# Patient Record
Sex: FEMALE | Race: WHITE | NOT HISPANIC OR LATINO | Employment: OTHER | URBAN - METROPOLITAN AREA
[De-identification: names, ages, dates, MRNs, and addresses within clinical notes are randomized per-mention and may not be internally consistent; named-entity substitution may affect disease eponyms.]

---

## 2017-10-31 ENCOUNTER — TRANSCRIBE ORDERS (OUTPATIENT)
Dept: ADMINISTRATIVE | Facility: HOSPITAL | Age: 67
End: 2017-10-31

## 2017-10-31 DIAGNOSIS — Z12.31 ENCOUNTER FOR MAMMOGRAM TO ESTABLISH BASELINE MAMMOGRAM: Primary | ICD-10-CM

## 2017-10-31 DIAGNOSIS — Z78.0 POST-MENOPAUSAL: ICD-10-CM

## 2017-12-01 ENCOUNTER — HOSPITAL ENCOUNTER (OUTPATIENT)
Dept: MAMMOGRAPHY | Facility: HOSPITAL | Age: 67
Discharge: HOME | End: 2017-12-01
Attending: INTERNAL MEDICINE | Admitting: INTERNAL MEDICINE

## 2017-12-01 ENCOUNTER — HOSPITAL ENCOUNTER (OUTPATIENT)
Dept: BONE DENSITY | Facility: HOSPITAL | Age: 67
Discharge: HOME | End: 2017-12-01
Attending: INTERNAL MEDICINE

## 2017-12-01 DIAGNOSIS — Z12.31 ENCOUNTER FOR MAMMOGRAM TO ESTABLISH BASELINE MAMMOGRAM: ICD-10-CM

## 2017-12-01 DIAGNOSIS — Z78.0 POST-MENOPAUSAL: ICD-10-CM

## 2017-12-01 PROCEDURE — G0202 SCR MAMMO BI INCL CAD: HCPCS

## 2017-12-01 PROCEDURE — 77080 DXA BONE DENSITY AXIAL: CPT

## 2018-11-01 ENCOUNTER — TRANSCRIBE ORDERS (OUTPATIENT)
Dept: ADMINISTRATIVE | Facility: HOSPITAL | Age: 68
End: 2018-11-01

## 2018-11-01 DIAGNOSIS — Z12.39 SCREENING BREAST EXAMINATION: Primary | ICD-10-CM

## 2018-11-02 VITALS
OXYGEN SATURATION: 97 % | SYSTOLIC BLOOD PRESSURE: 76 MMHG | HEART RATE: 72 BPM | SYSTOLIC BLOOD PRESSURE: 73 MMHG | RESPIRATION RATE: 14 BRPM | SYSTOLIC BLOOD PRESSURE: 89 MMHG | SYSTOLIC BLOOD PRESSURE: 81 MMHG | HEART RATE: 70 BPM | SYSTOLIC BLOOD PRESSURE: 156 MMHG | HEART RATE: 80 BPM | DIASTOLIC BLOOD PRESSURE: 42 MMHG | TEMPERATURE: 96.8 F | OXYGEN SATURATION: 100 % | RESPIRATION RATE: 12 BRPM | TEMPERATURE: 97.7 F | SYSTOLIC BLOOD PRESSURE: 123 MMHG | HEIGHT: 64 IN | WEIGHT: 152 LBS | HEART RATE: 78 BPM | RESPIRATION RATE: 17 BRPM | HEART RATE: 77 BPM | DIASTOLIC BLOOD PRESSURE: 47 MMHG | DIASTOLIC BLOOD PRESSURE: 55 MMHG | DIASTOLIC BLOOD PRESSURE: 82 MMHG | OXYGEN SATURATION: 98 % | RESPIRATION RATE: 18 BRPM | HEART RATE: 69 BPM | SYSTOLIC BLOOD PRESSURE: 78 MMHG | SYSTOLIC BLOOD PRESSURE: 84 MMHG | HEART RATE: 67 BPM | RESPIRATION RATE: 25 BRPM | DIASTOLIC BLOOD PRESSURE: 33 MMHG | DIASTOLIC BLOOD PRESSURE: 73 MMHG | RESPIRATION RATE: 16 BRPM | DIASTOLIC BLOOD PRESSURE: 50 MMHG | HEART RATE: 76 BPM | DIASTOLIC BLOOD PRESSURE: 37 MMHG | SYSTOLIC BLOOD PRESSURE: 141 MMHG | RESPIRATION RATE: 26 BRPM | DIASTOLIC BLOOD PRESSURE: 54 MMHG | OXYGEN SATURATION: 99 %

## 2018-11-05 ENCOUNTER — AMBULATORY SURGICAL CENTER (AMBULATORY)
Dept: URBAN - METROPOLITAN AREA SURGERY 17 | Facility: SURGERY | Age: 68
End: 2018-11-05
Payer: MEDICARE

## 2018-11-05 DIAGNOSIS — K64.8 OTHER HEMORRHOIDS: ICD-10-CM

## 2018-11-05 DIAGNOSIS — Z86.010 PERSONAL HISTORY OF COLONIC POLYPS: ICD-10-CM

## 2018-11-05 PROBLEM — Z12.11 SURVEILLANCE DUE TO PRIOR COLONIC NEOPLASIA: Status: ACTIVE | Noted: 2018-11-05

## 2018-11-05 PROCEDURE — G0105 COLORECTAL SCRN; HI RISK IND: HCPCS

## 2018-12-04 ENCOUNTER — HOSPITAL ENCOUNTER (OUTPATIENT)
Dept: MAMMOGRAPHY | Facility: HOSPITAL | Age: 68
Discharge: HOME | End: 2018-12-04
Attending: INTERNAL MEDICINE | Admitting: INTERNAL MEDICINE

## 2018-12-04 DIAGNOSIS — Z12.39 SCREENING BREAST EXAMINATION: ICD-10-CM

## 2018-12-04 PROCEDURE — 77063 BREAST TOMOSYNTHESIS BI: CPT

## 2018-12-04 PROCEDURE — 77067 SCR MAMMO BI INCL CAD: CPT

## 2019-11-05 ENCOUNTER — TRANSCRIBE ORDERS (OUTPATIENT)
Dept: ADMINISTRATIVE | Facility: HOSPITAL | Age: 69
End: 2019-11-05

## 2019-11-05 DIAGNOSIS — Z12.31 SCREENING MAMMOGRAM, ENCOUNTER FOR: Primary | ICD-10-CM

## 2019-12-13 ENCOUNTER — HOSPITAL ENCOUNTER (OUTPATIENT)
Dept: MAMMOGRAPHY | Facility: HOSPITAL | Age: 69
Discharge: HOME | End: 2019-12-13
Admitting: INTERNAL MEDICINE

## 2019-12-13 DIAGNOSIS — Z12.31 SCREENING MAMMOGRAM, ENCOUNTER FOR: ICD-10-CM

## 2019-12-13 PROCEDURE — 77067 SCR MAMMO BI INCL CAD: CPT

## 2019-12-13 PROCEDURE — 77063 BREAST TOMOSYNTHESIS BI: CPT

## 2020-11-05 ENCOUNTER — TRANSCRIBE ORDERS (OUTPATIENT)
Dept: ADMINISTRATIVE | Facility: HOSPITAL | Age: 70
End: 2020-11-05

## 2020-11-05 DIAGNOSIS — Z12.31 VISIT FOR SCREENING MAMMOGRAM: Primary | ICD-10-CM

## 2021-01-13 ENCOUNTER — HOSPITAL ENCOUNTER (OUTPATIENT)
Dept: MAMMOGRAPHY | Facility: HOSPITAL | Age: 71
Discharge: HOME | End: 2021-01-13
Admitting: INTERNAL MEDICINE

## 2021-01-13 DIAGNOSIS — Z12.31 VISIT FOR SCREENING MAMMOGRAM: ICD-10-CM

## 2021-01-13 PROCEDURE — 77067 SCR MAMMO BI INCL CAD: CPT

## 2021-01-13 PROCEDURE — 77063 BREAST TOMOSYNTHESIS BI: CPT

## 2021-03-09 DIAGNOSIS — Z23 IMMUNIZATION DUE: ICD-10-CM

## 2021-11-14 ENCOUNTER — HOSPITAL ENCOUNTER (OUTPATIENT)
Facility: HOSPITAL | Age: 71
Setting detail: OBSERVATION
Discharge: HOME | End: 2021-11-15
Attending: EMERGENCY MEDICINE | Admitting: EMERGENCY MEDICINE

## 2021-11-14 DIAGNOSIS — R11.2 NON-INTRACTABLE VOMITING WITH NAUSEA, UNSPECIFIED VOMITING TYPE: ICD-10-CM

## 2021-11-14 DIAGNOSIS — R42 DIZZINESS: Primary | ICD-10-CM

## 2021-11-14 DIAGNOSIS — R42 VERTIGO: ICD-10-CM

## 2021-11-14 LAB
ALBUMIN SERPL-MCNC: 4.5 G/DL (ref 3.5–5.2)
ALBUMIN/GLOB SERPL: 2 G/DL
ALP SERPL-CCNC: 50 U/L (ref 39–117)
ALT SERPL W P-5'-P-CCNC: 21 U/L (ref 1–33)
ANION GAP SERPL CALCULATED.3IONS-SCNC: 12.9 MMOL/L (ref 5–15)
APTT PPP: 33.5 SECONDS (ref 22.7–35.4)
AST SERPL-CCNC: 16 U/L (ref 1–32)
BASOPHILS # BLD AUTO: 0.02 10*3/MM3 (ref 0–0.2)
BASOPHILS NFR BLD AUTO: 0.3 % (ref 0–1.5)
BILIRUB SERPL-MCNC: 0.4 MG/DL (ref 0–1.2)
BUN SERPL-MCNC: 22 MG/DL (ref 8–23)
BUN/CREAT SERPL: 31.4 (ref 7–25)
CALCIUM SPEC-SCNC: 9.5 MG/DL (ref 8.6–10.5)
CHLORIDE SERPL-SCNC: 102 MMOL/L (ref 98–107)
CO2 SERPL-SCNC: 23.1 MMOL/L (ref 22–29)
CREAT SERPL-MCNC: 0.7 MG/DL (ref 0.57–1)
DEPRECATED RDW RBC AUTO: 46.1 FL (ref 37–54)
EOSINOPHIL # BLD AUTO: 0.03 10*3/MM3 (ref 0–0.4)
EOSINOPHIL NFR BLD AUTO: 0.4 % (ref 0.3–6.2)
ERYTHROCYTE [DISTWIDTH] IN BLOOD BY AUTOMATED COUNT: 13.7 % (ref 12.3–15.4)
GFR SERPL CREATININE-BSD FRML MDRD: 82 ML/MIN/1.73
GLOBULIN UR ELPH-MCNC: 2.3 GM/DL
GLUCOSE SERPL-MCNC: 123 MG/DL (ref 65–99)
HCT VFR BLD AUTO: 33 % (ref 34–46.6)
HGB BLD-MCNC: 10.9 G/DL (ref 12–15.9)
HOLD SPECIMEN: NORMAL
HOLD SPECIMEN: NORMAL
IMM GRANULOCYTES # BLD AUTO: 0.03 10*3/MM3 (ref 0–0.05)
IMM GRANULOCYTES NFR BLD AUTO: 0.4 % (ref 0–0.5)
INR PPP: 1.03 (ref 0.9–1.1)
LIPASE SERPL-CCNC: 31 U/L (ref 13–60)
LYMPHOCYTES # BLD AUTO: 0.89 10*3/MM3 (ref 0.7–3.1)
LYMPHOCYTES NFR BLD AUTO: 12.4 % (ref 19.6–45.3)
MCH RBC QN AUTO: 31.7 PG (ref 26.6–33)
MCHC RBC AUTO-ENTMCNC: 33 G/DL (ref 31.5–35.7)
MCV RBC AUTO: 95.9 FL (ref 79–97)
MONOCYTES # BLD AUTO: 0.48 10*3/MM3 (ref 0.1–0.9)
MONOCYTES NFR BLD AUTO: 6.7 % (ref 5–12)
NEUTROPHILS NFR BLD AUTO: 5.72 10*3/MM3 (ref 1.7–7)
NEUTROPHILS NFR BLD AUTO: 79.8 % (ref 42.7–76)
NRBC BLD AUTO-RTO: 0 /100 WBC (ref 0–0.2)
PLATELET # BLD AUTO: 204 10*3/MM3 (ref 140–450)
PMV BLD AUTO: 10.1 FL (ref 6–12)
POTASSIUM SERPL-SCNC: 3.8 MMOL/L (ref 3.5–5.2)
PROT SERPL-MCNC: 6.8 G/DL (ref 6–8.5)
PROTHROMBIN TIME: 13.3 SECONDS (ref 11.7–14.2)
RBC # BLD AUTO: 3.44 10*6/MM3 (ref 3.77–5.28)
SODIUM SERPL-SCNC: 138 MMOL/L (ref 136–145)
WBC # BLD AUTO: 7.17 10*3/MM3 (ref 3.4–10.8)
WHOLE BLOOD HOLD SPECIMEN: NORMAL
WHOLE BLOOD HOLD SPECIMEN: NORMAL

## 2021-11-14 PROCEDURE — 85025 COMPLETE CBC W/AUTO DIFF WBC: CPT | Performed by: EMERGENCY MEDICINE

## 2021-11-14 PROCEDURE — 96375 TX/PRO/DX INJ NEW DRUG ADDON: CPT

## 2021-11-14 PROCEDURE — 93005 ELECTROCARDIOGRAM TRACING: CPT

## 2021-11-14 PROCEDURE — 80053 COMPREHEN METABOLIC PANEL: CPT | Performed by: EMERGENCY MEDICINE

## 2021-11-14 PROCEDURE — 99285 EMERGENCY DEPT VISIT HI MDM: CPT

## 2021-11-14 PROCEDURE — 83690 ASSAY OF LIPASE: CPT | Performed by: EMERGENCY MEDICINE

## 2021-11-14 PROCEDURE — 85610 PROTHROMBIN TIME: CPT | Performed by: EMERGENCY MEDICINE

## 2021-11-14 PROCEDURE — 25010000002 ONDANSETRON PER 1 MG: Performed by: EMERGENCY MEDICINE

## 2021-11-14 PROCEDURE — 96374 THER/PROPH/DIAG INJ IV PUSH: CPT

## 2021-11-14 PROCEDURE — 93010 ELECTROCARDIOGRAM REPORT: CPT | Performed by: INTERNAL MEDICINE

## 2021-11-14 PROCEDURE — 25010000002 DIAZEPAM PER 5 MG: Performed by: EMERGENCY MEDICINE

## 2021-11-14 PROCEDURE — 99204 OFFICE O/P NEW MOD 45 MIN: CPT | Performed by: PSYCHIATRY & NEUROLOGY

## 2021-11-14 PROCEDURE — 87635 SARS-COV-2 COVID-19 AMP PRB: CPT | Performed by: EMERGENCY MEDICINE

## 2021-11-14 PROCEDURE — 85730 THROMBOPLASTIN TIME PARTIAL: CPT | Performed by: EMERGENCY MEDICINE

## 2021-11-14 PROCEDURE — 93005 ELECTROCARDIOGRAM TRACING: CPT | Performed by: EMERGENCY MEDICINE

## 2021-11-14 RX ORDER — DIAZEPAM 5 MG/ML
5 INJECTION, SOLUTION INTRAMUSCULAR; INTRAVENOUS ONCE
Status: COMPLETED | OUTPATIENT
Start: 2021-11-14 | End: 2021-11-14

## 2021-11-14 RX ORDER — SODIUM CHLORIDE 0.9 % (FLUSH) 0.9 %
10 SYRINGE (ML) INJECTION AS NEEDED
Status: DISCONTINUED | OUTPATIENT
Start: 2021-11-14 | End: 2021-11-15 | Stop reason: HOSPADM

## 2021-11-14 RX ORDER — SCOLOPAMINE TRANSDERMAL SYSTEM 1 MG/1
1 PATCH, EXTENDED RELEASE TRANSDERMAL ONCE
Status: DISCONTINUED | OUTPATIENT
Start: 2021-11-14 | End: 2021-11-15 | Stop reason: HOSPADM

## 2021-11-14 RX ORDER — ONDANSETRON 2 MG/ML
4 INJECTION INTRAMUSCULAR; INTRAVENOUS ONCE
Status: COMPLETED | OUTPATIENT
Start: 2021-11-14 | End: 2021-11-14

## 2021-11-14 RX ADMIN — DIAZEPAM 5 MG: 5 INJECTION, SOLUTION INTRAMUSCULAR; INTRAVENOUS at 23:41

## 2021-11-14 RX ADMIN — ONDANSETRON 4 MG: 2 INJECTION INTRAMUSCULAR; INTRAVENOUS at 23:41

## 2021-11-14 RX ADMIN — SCOLOPAMINE TRANSDERMAL SYSTEM 1 PATCH: 1 PATCH, EXTENDED RELEASE TRANSDERMAL at 22:43

## 2021-11-15 ENCOUNTER — APPOINTMENT (OUTPATIENT)
Dept: MRI IMAGING | Facility: HOSPITAL | Age: 71
End: 2021-11-15

## 2021-11-15 ENCOUNTER — APPOINTMENT (OUTPATIENT)
Dept: CT IMAGING | Facility: HOSPITAL | Age: 71
End: 2021-11-15

## 2021-11-15 VITALS
RESPIRATION RATE: 16 BRPM | HEART RATE: 84 BPM | TEMPERATURE: 98 F | WEIGHT: 150 LBS | DIASTOLIC BLOOD PRESSURE: 67 MMHG | BODY MASS INDEX: 25.61 KG/M2 | SYSTOLIC BLOOD PRESSURE: 115 MMHG | HEIGHT: 64 IN | OXYGEN SATURATION: 96 %

## 2021-11-15 PROBLEM — R42 DIZZINESS: Status: ACTIVE | Noted: 2021-11-15

## 2021-11-15 LAB
BILIRUB UR QL STRIP: NEGATIVE
CLARITY UR: CLEAR
COLOR UR: YELLOW
GLUCOSE UR STRIP-MCNC: NEGATIVE MG/DL
HGB UR QL STRIP.AUTO: NEGATIVE
KETONES UR QL STRIP: NEGATIVE
LEUKOCYTE ESTERASE UR QL STRIP.AUTO: NEGATIVE
NITRITE UR QL STRIP: NEGATIVE
PH UR STRIP.AUTO: 6.5 [PH] (ref 5–8)
PROT UR QL STRIP: NEGATIVE
QT INTERVAL: 395 MS
SARS-COV-2 RNA PNL SPEC NAA+PROBE: NOT DETECTED
SP GR UR STRIP: 1.02 (ref 1–1.03)
UROBILINOGEN UR QL STRIP: NORMAL

## 2021-11-15 PROCEDURE — 70498 CT ANGIOGRAPHY NECK: CPT

## 2021-11-15 PROCEDURE — 97116 GAIT TRAINING THERAPY: CPT

## 2021-11-15 PROCEDURE — 70496 CT ANGIOGRAPHY HEAD: CPT

## 2021-11-15 PROCEDURE — 99214 OFFICE O/P EST MOD 30 MIN: CPT | Performed by: PSYCHIATRY & NEUROLOGY

## 2021-11-15 PROCEDURE — 97161 PT EVAL LOW COMPLEX 20 MIN: CPT

## 2021-11-15 PROCEDURE — G0378 HOSPITAL OBSERVATION PER HR: HCPCS

## 2021-11-15 PROCEDURE — 0 IOPAMIDOL PER 1 ML: Performed by: EMERGENCY MEDICINE

## 2021-11-15 PROCEDURE — 25010000002 LORAZEPAM PER 2 MG: Performed by: EMERGENCY MEDICINE

## 2021-11-15 PROCEDURE — 81003 URINALYSIS AUTO W/O SCOPE: CPT | Performed by: EMERGENCY MEDICINE

## 2021-11-15 PROCEDURE — 70553 MRI BRAIN STEM W/O & W/DYE: CPT

## 2021-11-15 PROCEDURE — 0 GADOBENATE DIMEGLUMINE 529 MG/ML SOLUTION: Performed by: EMERGENCY MEDICINE

## 2021-11-15 PROCEDURE — 96375 TX/PRO/DX INJ NEW DRUG ADDON: CPT

## 2021-11-15 PROCEDURE — A9577 INJ MULTIHANCE: HCPCS | Performed by: EMERGENCY MEDICINE

## 2021-11-15 RX ORDER — LEVOTHYROXINE SODIUM 88 UG/1
88 TABLET ORAL
Status: DISCONTINUED | OUTPATIENT
Start: 2021-11-15 | End: 2021-11-15 | Stop reason: HOSPADM

## 2021-11-15 RX ORDER — HYDROCHLOROTHIAZIDE 12.5 MG/1
12.5 TABLET ORAL DAILY
COMMUNITY

## 2021-11-15 RX ORDER — OLMESARTAN MEDOXOMIL 20 MG/1
40 TABLET ORAL DAILY
COMMUNITY

## 2021-11-15 RX ORDER — LEVOTHYROXINE SODIUM 88 UG/1
88 TABLET ORAL DAILY
COMMUNITY

## 2021-11-15 RX ORDER — SCOLOPAMINE TRANSDERMAL SYSTEM 1 MG/1
1 PATCH, EXTENDED RELEASE TRANSDERMAL ONCE
Qty: 4 PATCH | Refills: 0 | Status: SHIPPED | OUTPATIENT
Start: 2021-11-15 | End: 2021-11-22

## 2021-11-15 RX ORDER — ONDANSETRON 4 MG/1
4 TABLET, FILM COATED ORAL EVERY 6 HOURS PRN
Status: DISCONTINUED | OUTPATIENT
Start: 2021-11-15 | End: 2021-11-15 | Stop reason: HOSPADM

## 2021-11-15 RX ORDER — ATORVASTATIN CALCIUM 20 MG/1
40 TABLET, FILM COATED ORAL DAILY
Status: DISCONTINUED | OUTPATIENT
Start: 2021-11-15 | End: 2021-11-15 | Stop reason: HOSPADM

## 2021-11-15 RX ORDER — LORAZEPAM 2 MG/ML
1 INJECTION INTRAMUSCULAR ONCE
Status: COMPLETED | OUTPATIENT
Start: 2021-11-15 | End: 2021-11-15

## 2021-11-15 RX ORDER — HYDROCHLOROTHIAZIDE 12.5 MG/1
12.5 TABLET ORAL DAILY
Status: DISCONTINUED | OUTPATIENT
Start: 2021-11-15 | End: 2021-11-15 | Stop reason: HOSPADM

## 2021-11-15 RX ORDER — LOSARTAN POTASSIUM 50 MG/1
50 TABLET ORAL
Status: DISCONTINUED | OUTPATIENT
Start: 2021-11-15 | End: 2021-11-15 | Stop reason: HOSPADM

## 2021-11-15 RX ORDER — NITROGLYCERIN 0.4 MG/1
0.4 TABLET SUBLINGUAL
Status: DISCONTINUED | OUTPATIENT
Start: 2021-11-15 | End: 2021-11-15 | Stop reason: HOSPADM

## 2021-11-15 RX ORDER — MONTELUKAST SODIUM 10 MG/1
10 TABLET ORAL NIGHTLY
Status: DISCONTINUED | OUTPATIENT
Start: 2021-11-15 | End: 2021-11-15 | Stop reason: HOSPADM

## 2021-11-15 RX ORDER — SODIUM CHLORIDE 0.9 % (FLUSH) 0.9 %
10 SYRINGE (ML) INJECTION AS NEEDED
Status: DISCONTINUED | OUTPATIENT
Start: 2021-11-15 | End: 2021-11-15 | Stop reason: HOSPADM

## 2021-11-15 RX ORDER — ONDANSETRON 2 MG/ML
4 INJECTION INTRAMUSCULAR; INTRAVENOUS EVERY 6 HOURS PRN
Status: DISCONTINUED | OUTPATIENT
Start: 2021-11-15 | End: 2021-11-15 | Stop reason: HOSPADM

## 2021-11-15 RX ORDER — ATORVASTATIN CALCIUM 40 MG/1
40 TABLET, FILM COATED ORAL DAILY
COMMUNITY

## 2021-11-15 RX ORDER — SODIUM CHLORIDE 0.9 % (FLUSH) 0.9 %
10 SYRINGE (ML) INJECTION EVERY 12 HOURS SCHEDULED
Status: DISCONTINUED | OUTPATIENT
Start: 2021-11-15 | End: 2021-11-15 | Stop reason: HOSPADM

## 2021-11-15 RX ORDER — MONTELUKAST SODIUM 10 MG/1
10 TABLET ORAL DAILY
COMMUNITY

## 2021-11-15 RX ADMIN — ATORVASTATIN CALCIUM 40 MG: 20 TABLET, FILM COATED ORAL at 09:00

## 2021-11-15 RX ADMIN — IOPAMIDOL 85 ML: 755 INJECTION, SOLUTION INTRAVENOUS at 00:50

## 2021-11-15 RX ADMIN — GADOBENATE DIMEGLUMINE 14 ML: 529 INJECTION, SOLUTION INTRAVENOUS at 07:55

## 2021-11-15 RX ADMIN — SODIUM CHLORIDE, PRESERVATIVE FREE 10 ML: 5 INJECTION INTRAVENOUS at 09:00

## 2021-11-15 RX ADMIN — HYDROCHLOROTHIAZIDE 12.5 MG: 12.5 CAPSULE ORAL at 09:00

## 2021-11-15 RX ADMIN — LORAZEPAM 1 MG: 2 INJECTION INTRAMUSCULAR; INTRAVENOUS at 07:12

## 2021-11-15 RX ADMIN — LOSARTAN POTASSIUM 50 MG: 50 TABLET, FILM COATED ORAL at 09:00

## 2021-11-15 RX ADMIN — LEVOTHYROXINE SODIUM 88 MCG: 0.09 TABLET ORAL at 05:43

## 2021-11-15 RX ADMIN — SODIUM CHLORIDE, PRESERVATIVE FREE 10 ML: 5 INJECTION INTRAVENOUS at 02:29

## 2021-11-15 RX ADMIN — CARBAMIDE PEROXIDE 6.5% 5 DROP: 6.5 LIQUID AURICULAR (OTIC) at 09:00

## 2021-11-15 NOTE — PROGRESS NOTES
[facility]     Progress Note    Name: Roro Masters ADMIT: 2021   : 1950  PCP: Radha Prajapati MD    MRN: [id number] LOS: 0 days   AGE/SEX: 71 y.o. female  ROOM: [facility]/     Subjective   Subjective     Subjective   Patient reports her dizziness has improved some but not totally gone. She denies other complaints at this time.    Review of Systems   Neurological: Positive for dizziness.   All other systems reviewed and are negative.         Objective   Objective     Objective   Vital Signs  Temp:  [97.5 °F (36.4 °C)-97.9 °F (36.6 °C)] 97.6 °F (36.4 °C)  Heart Rate:  [71-90] 74  Resp:  [16-18] 16  BP: (127-175)/(64-90) 137/82  SpO2:  [97 %-100 %] 98 %  on   ;   Device (Oxygen Therapy): room air  Body mass index is 25.75 kg/m².  Physical Exam  Vitals and nursing note reviewed.   Constitutional:       General: She is not in acute distress.     Appearance: Normal appearance. She is normal weight.   HENT:      Head: Normocephalic and atraumatic.      Nose: Nose normal.      Mouth/Throat:      Mouth: Mucous membranes are moist.   Eyes:      Extraocular Movements: Extraocular movements intact.   Cardiovascular:      Rate and Rhythm: Normal rate and regular rhythm.      Pulses: Normal pulses.      Heart sounds: Normal heart sounds.   Pulmonary:      Effort: Pulmonary effort is normal.      Breath sounds: Normal breath sounds.   Abdominal:      General: Abdomen is flat. Bowel sounds are normal.      Palpations: Abdomen is soft.   Musculoskeletal:         General: No swelling. Normal range of motion.      Cervical back: Normal range of motion and neck supple.   Skin:     General: Skin is warm and dry.   Neurological:      General: No focal deficit present.      Mental Status: She is alert and oriented to person, place, and time. Mental status is at baseline.   Psychiatric:         Mood and Affect: Mood normal.         Behavior: Behavior normal.         Thought Content: Thought content normal.         Judgment:  Judgment normal.         Results Review     I reviewed the patient's new clinical results.  Results from last 7 days   Lab Units 11/14/21  2233   WBC 10*3/mm3 7.17   HEMOGLOBIN g/dL 10.9*   PLATELETS 10*3/mm3 204     Results from last 7 days   Lab Units 11/14/21  2233   SODIUM mmol/L 138   POTASSIUM mmol/L 3.8   CHLORIDE mmol/L 102   CO2 mmol/L 23.1   BUN mg/dL 22   CREATININE mg/dL 0.70   GLUCOSE mg/dL 123*   Estimated Creatinine Clearance: 59.3 mL/min (by C-G formula based on SCr of 0.7 mg/dL).  Results from last 7 days   Lab Units 11/14/21  2233   ALBUMIN g/dL 4.50   BILIRUBIN mg/dL 0.4   ALK PHOS U/L 50   AST (SGOT) U/L 16   ALT (SGPT) U/L 21     Results from last 7 days   Lab Units 11/14/21  2233   CALCIUM mg/dL 9.5   ALBUMIN g/dL 4.50       COVID19   Date Value Ref Range Status   11/14/2021 Not Detected Not Detected - Ref. Range Final     No results found for: HGBA1C, POCGLU    CT Angiogram Head w AI Analysis of LVO, CT Angiogram Neck  Narrative: NONCONTRAST CRANIAL CT SCAN, CT ANGIOGRAM NECK, CT ANGIOGRAM HEAD.     HISTORY: Dizziness.     COMPARISON: None..     TECHNIQUE:  Radiation dose reduction techniques were utilized, including  automated exposure control and exposure modulation based on body size.   Initially, a routine noncontrast cranial CT performed from the skull  base through the vertex region. After review, thin-section contrast  enhanced CT angiogram images obtained from the aortic arch through the  calvarial vertex utilizing angiographic technique. Multi projection 3-D  MIP reformatted images were supplemented and reviewed.     FINDINGS CRANIAL CT: No acute hemorrhage or midline shift is  demonstrated..  Ventricular size and configuration is within normal  limits for age..  Postcontrast imaging does not demonstrate any abnormal  enhancement or evidence of venous occlusion.  Bone window images Reveal  no significant osseous findings..        CERVICAL CAROTID CT ANGIOGRAM:     FINDINGS: There is a  short common trunk of the brachiocephalic artery  and left common carotid artery. Both common carotid arteries are widely  patent. There is some calcified plaque which is noted at the right  carotid bifurcation. This results in narrowing in the range of 30%. Some  additional mild calcified plaque is seen within the proximal right  internal carotid artery, without hemodynamically significant stenosis.  Additional mild plaque is noted at the left carotid bifurcation, again  without hemodynamically significant stenosis. The vertebral arteries  appear unremarkable bilaterally. Left is slightly larger in caliber.  Images through the lung apices do not demonstrate any acute  abnormalities. Left lobe of thyroid gland is atrophic or absent. There  are multilevel degenerative changes of the cervical spine.        NASCET criteria utilized in stenosis measurements. Stenosis mild, 0-49%.     CRANIAL CTA ANGIOGRAM:     FINDINGS:  The intracranial internal carotid arteries are patent. There  is some calcified plaque, without hemodynamically significant stenosis.  There is a patent anterior communicating artery. Anterior cerebral  arteries are normal. Middle cerebral arteries are unremarkable  bilaterally.  Both intracranial vertebral arteries are patent. Left is  dominant. Basilar artery is normal. The posterior cerebral arteries are  patent bilaterally..          AI analysis of LVO was utilized.     Radiation dose reduction techniques were utilized, including automated  exposure control and exposure modulation based on body size.        Impression: 1. No acute intracranial findings.  2. Mild narrowing of the proximal right internal carotid artery, in the  range of 30%.  3. No intracranial stenosis or occlusion.     FINDINGS were relayed to Dr. Lyles at 1:10 AM.     This report was finalized on 11/15/2021 2:28 AM by Dr. Nichol Hendricks M.D.       Scheduled Medications  atorvastatin, 40 mg, Oral, Daily  carbamide peroxide, 5  drop, Both Ears, BID  hydroCHLOROthiazide, 12.5 mg, Oral, Daily  levothyroxine, 88 mcg, Oral, Q AM  losartan, 50 mg, Oral, Q24H  montelukast, 10 mg, Oral, Nightly  Scopolamine, 1 patch, Transdermal, Once  sodium chloride, 10 mL, Intravenous, Q12H    Infusions   Diet  Diet Regular         Assessment/Plan   Assessment / Plan       Active Hospital Problems    Diagnosis  POA   • Dizziness [R42]  Yes      Resolved Hospital Problems   No resolved problems to display.       71 y.o. female admitted with dizziness. Patient reports this dizziness began yesterday and has only slightly improved since arriving in ED. Patient does additionally report nausea with has improved. Denies unilateral weakness, headache, visual or sensation changes, gait abnormalities or difficulty speaking or swallowing. Workup in ED was fairly unremarkable and CTA head/neck were negative. MRI was done but still pending read. Neurology has been consulted and is also still pending at this time. Will await PT consult for Epley maneuver and likely send home today if MRI negative.     · SCDs for DVT prophylaxis.  · Full code.  · Discussed with patient.  · Anticipate discharge today.    This progress note will additionally serve as discharge summary.     LAINE Melendez  [facility]  11/15/21  08:21 EST

## 2021-11-15 NOTE — CONSULTS
DOS: 2021  NAME: Roro Masters   : 1950  PCP: Radha Prajapati MD  CC: Stroke  Referring MD: No ref. provider found    Neurological Problem and Interval History:  71 y.o. right-handed white female with a Hx of hypertension and earwax buildup and goes to the ENT twice a year to get her earwax cleaned out noticed today morning that she was extremely off balance and very nauseated started throwing up.  Later this evening her  brought her to the emergency room as she was not getting better.  She is currently afraid to move her head too much as she feels that the whole room is spinning from right to the left.  She also has a left upward gaze nystagmus which is exacerbated by the Cody-Hallpike maneuver.  She denies any history of recent sinus infection or earaches or ear drainage or loss of hearing.  She often gets tinnitus but not today.  She denies any weakness of her arms and legs or any double vision or any seizure-like activity or loss of bowel and bladder control..    Past Medical/Surgical Hx:  No past medical history on file.  Past Surgical History:   Procedure Laterality Date   • HYSTERECTOMY     • OOPHORECTOMY         Review of Systems:    Constitutional: Pleasant lady currently laying in bed very still as she is afraid to move  Cardiovascular: Denies any chest pain or palpitations.  Respiratory: Denies any shortness of breath.  Gastrointestinal: Has been extremely nauseated earlier but currently stable.  Genitourinary: Denies any bladder incontinence.  Musculoskeletal: Denies any aches and pains in the muscles.  Dermatological: Denies any skin breakdown.  Neurological: Complains of room spinning.  Psychiatric: Denies any underlying anxiety or depression.  Ophthalmological: Denies any visual changes.          Medications On Admission  (Not in a hospital admission)      Allergies:  Allergies   Allergen Reactions   • Sulfa Antibiotics        Social Hx:  Social History     Socioeconomic History  "  • Marital status:        Family Hx:  Family History   Problem Relation Age of Onset   • Breast cancer Brother    • Breast cancer Sister        Review of Imaging (Interpretation of images not reports): CT of the brain along with CT angiogram of the head and neck with contrast is currently pending.        Laboratory Results:   Lab Results   Component Value Date    CALCIUM 9.9 06/28/2019     06/28/2019    K 4.0 06/28/2019    CO2 30 06/28/2019     06/28/2019    BUN 23 (H) 06/28/2019    CREATININE 0.80 09/28/2020    BCR 25.6 06/28/2019     Lab Results   Component Value Date    WBC 7.17 11/14/2021    HGB 10.9 (L) 11/14/2021    HCT 33.0 (L) 11/14/2021    MCV 95.9 11/14/2021     11/14/2021         Physical Examination:  /86 (Patient Position: Standing)   Pulse 85   Temp 97.9 °F (36.6 °C) (Tympanic)   Resp 16   Ht 162.6 cm (64\")   Wt 68 kg (150 lb)   LMP  (LMP Unknown)   SpO2 100%   BMI 25.75 kg/m²   General Appearance:   Well developed, well nourished, well groomed, alert, and cooperative.  HEENT: Normocephalic.  Normal fundoscopic exam including normal retina, discs are flat with sharp margins, normal vasculature.  Neck and Spine: Normal range of motion.  Normal alignment. No mass or tenderness. No bruits.  Cardiac: Regular rate and rhythm. No murmurs.  Peripheral Vasculature: Radial and pedal pulses are equal and symmetric. No signs of distal embolization.  Extremities:    No edema or deformities. Normal joint ROM. LEATHA hoses and SCD's in place  Skin:    No rashes or birth marks.    Neurological examination:  Higher Integrative  Function: Oriented to time, place and person. Normal registration, recall, attention span and concentration. Normal language including comprehension, spontaneous speech, repetition, reading, writing, naming and vocabulary. No neglect with normal visual-spatial function and construction. Normal fund of knowledge and higher integrative function.  CN " II: Pupils are equal, round, and reactive to light. Normal visual acuity and visual fields.    CN III IV VI: Extraocular movements are full without nystagmus.   CN V: Normal facial sensation and strength of muscles of mastication.  CN VII: Facial movements are symmetric. No weakness.  CN VIII:   Auditory acuity is normal.  CN IX & X:   Symmetric palatal movement.  CN XI: Sternocleidomastoid and trapezius are normal.  No weakness.  CN XII:   The tongue is midline.  No atrophy or fasciculations.  Motor: Normal muscle strength, bulk and tone in upper and lower extremities.  No fasciculations, rigidity, spasticity, or abnormal movements.  Reflexes: 2+ in the upper and lower extremities. Plantar responses are flexor.  Sensation: Normal to light touch, pinprick, vibration, temperature, and proprioception in arms and legs. Normal graphesthesia and no extinction on DSS.  Station and Gait: The Cody-Hallpike maneuver is positive with right upbeat nystagmus noted.  Coordination: Finger to nose test shows no dysmetria.  Rapid alternating movements are normal.  Heel to shin normal.    NIHSS:    Baseline  0-->Alert: keenly responsive  0-->Answers both questions correctly  0-->Performs both tasks correctly  0=normal  0=No visual loss  0=Normal symmetric movement  0-->No drift: limb holds 90 (or 45) degrees for full 10 secs  0-->No drift: limb holds 90 (or 45) degrees for full 10 secs  0-->No drift: limb holds 90 (or 45) degrees for full 10 secs  0-->No drift: limb holds 90 (or 45) degrees for full 10 secs  0=Absent  0=Normal; no sensory loss  0=No aphasia, normal  0=Normal  0=No abnormality    Total score: 0    Diagnoses / Discussion:  71 y.o. who presents with Sx of acute vertiginous symptoms with feeling of the room spinning from the right to the left along with left upgaze nystagmus which is sustained on performing the Cody-Hallpike maneuver.    Plan:  CT angiogram of the head and neck with contrast is currently pending.  MRI of  the brain with and without contrast with thin sections through bilateral internal auditory canals to rule out any schwannomas or vestibulitis.  Scopolamine patch 1.5 mg every 72 hours.   To have physical therapy performed the Epley maneuver.  To use Debrox eardrops for disimpaction of bilateral earwax..     I have discussed the above with the patient and family.  From tomorrow onwards, my colleague Dr. Ted Garcia will follow up the patient.  Time spent with patient: 60min    Coding    Dictated using Dragon dictation.

## 2021-11-15 NOTE — H&P
".   [facility]   HISTORY AND PHYSICAL    Patient Name: Roro Masters  : 1950  MRN: [id number]  Primary Care Physician:  Radha Prajapati MD  Date of admission: 2021    Subjective   Subjective     Chief Complaint: Dizziness      HPI:    Roro Masters is a 71 y.o. female with past medical history of hypertension, hyperlipidemia, RA, and hypothyroidism presents [facility] for evaluation of her dizziness.  Patient reports around 0900 yesterday morning she experienced some dizziness while she was cooking breakfast in the kitchen.  Patient describes her dizziness as\" room spinning\" while moving.  Patient states that she sat down and her symptoms improved.  Reports physical activity  exacerbate her symptoms.  Patient reports associated nausea but denies vomiting.  Patient reports no history of vertigo.  Denies any recent sinus infection or earaches or ear drainage or loss of hearing.  Denies losing balance or falling but does report being unsteady on her feet.  Denies headache, chest pain, or dyspnea.  Denies fever or cough.  Denies abdominal pain, vomiting or diarrhea.  Denies edema to lower extremities.  Denies recent exposure to Covid 19.    Review of Systems   All systems were reviewed and negative except for: Dizziness with movement     Personal History     Past medical history  Hyperlipidemia  Hypothyroidism  Hypertension  Rheumatoid arthritis    Past Surgical History:   Procedure Laterality Date   • HYSTERECTOMY     • OOPHORECTOMY         Family History: family history includes Breast cancer in her brother and sister. Otherwise pertinent FHx was reviewed and not pertinent to current issue.    Social History:      Home Medications:       Allergies:  Allergies   Allergen Reactions   • Sulfa Antibiotics        Objective   Objective     Vitals:   Temp:  [97.9 °F (36.6 °C)] 97.9 °F (36.6 °C)  Heart Rate:  [71-90] 77  Resp:  [16] 16  BP: (127-175)/(64-90) 127/64  Physical " Exam    Constitutional: Awake, alert   Eyes: PERRLA, sclerae anicteric, no conjunctival injection   HENT: NCAT, mucous membranes moist   Neck: Supple, no thyromegaly, no lymphadenopathy, trachea midline   Respiratory: Clear to auscultation bilaterally, nonlabored respirations    Cardiovascular: RRR, no murmurs, rubs, or gallops, palpable pedal pulses bilaterally   Gastrointestinal: Positive bowel sounds, soft, nontender, nondistended   Musculoskeletal: No bilateral ankle edema, no clubbing or cyanosis to extremities   Psychiatric: Appropriate affect, cooperative   Neurologic: Oriented x 3, strength symmetric in all extremities, Cranial Nerves grossly intact to confrontation, speech clear   Skin: No rashes     NIH Stroke Scale      Interval: Baseline  Time: 01:47 EST  Person Administering Scale: LAINE Michel    Administer stroke scale items in the order listed. Record performance in each category after each subscale exam. Do not go back and change scores. Follow directions provided for each exam technique. Scores should reflect what the patient does, not what the clinician thinks the patient can do. The clinician should record answers while administering the exam and work quickly. Except where indicated, the patient should not be coached (i.e., repeated requests to patient to make a special effort).      1a  Level of consciousness: 0=alert; keenly responsive   1b. LOC questions:  0=Performs both tasks correctly   1c. LOC commands: 0=Answers both questions correctly   2.  Best Gaze: 0=normal   3.  Visual: 0=No visual loss   4. Facial Palsy: 0=Normal symmetric movement   5a.  Motor left arm: 0=No drift, limb holds 90 (or 45) degrees for full 10 seconds   5b.  Motor right arm: 0=No drift, limb holds 90 (or 45) degrees for full 10 seconds   6a. motor left le=No drift, limb holds 90 (or 45) degrees for full 10 seconds   6b  Motor right le=No drift, limb holds 90 (or 45) degrees for full 10 seconds   7.  Limb Ataxia: 0=Absent   8.  Sensory: 1=Mild to moderate sensory loss; patient feels pinprick is less sharp or is dull on the affected side; there is a loss of superficial pain with pinprick but patient is aware She is being touched   9. Best Language:  0=No aphasia, normal   10. Dysarthria: 0=Normal   11. Extinction and Inattention: 0=No abnormality   12. Distal motor function: 0=Normal    Total:   0     Result Review    Result Review:  I have personally reviewed the results from the time of this admission to 11/15/2021 01:39 EST and agree with these findings:  [x]  Laboratory  []  Microbiology  [x]  Radiology  []  EKG/Telemetry   []  Cardiology/Vascular   []  Pathology  []  Old records  []  Other:  Most notable findings include:  EKG negative for ischemic changes  CTA head and neck pending  Glucose 123, BUN/creatinine ratio 31.4, lipase 31,  Assessment/Plan   Assessment / Plan     Brief Patient Summary:  Roro Masters is a 71 y.o. female who was seen and evaluated at the ED for dizziness.  Patient is being admitted to observation for further evaluation and neurology consult.    Active Hospital Problems:  Active Hospital Problems    Diagnosis    • Dizziness      Plan:   Dizziness  CTA head and neck pending  MRI brain with and without in a.m.  PT consult for Epley maneuver  Scopolamine patch  Neurology consult    Nausea  Antiemetic medication as needed    Hypertension and hyperlipidemia  Continue home medication    Hypothyroidism  Continue home medication    Rheumatoid arthritis  Continue home medication    DVT prophylaxis:  SCds      CODE STATUS:     Full    Admission Status:  I believe this patient meets observation status.    Electronically signed by LAINE Michel, 11/15/21, 1:39 AM EST.

## 2021-11-15 NOTE — THERAPY EVALUATION
Patient Name: Roro Masters  : 1950    MRN: [id number]                              Today's Date: 11/15/2021       Admit Date: 2021    Visit Dx:     ICD-10-CM ICD-9-CM   1. Dizziness  R42 780.4   2. Vertigo  R42 780.4   3. Non-intractable vomiting with nausea, unspecified vomiting type  R11.2 787.01     Patient Active Problem List   Diagnosis   • Dizziness     Past Medical History:   Diagnosis Date   • Disease of thyroid gland    • Hyperlipidemia    • Hypertension      Past Surgical History:   Procedure Laterality Date   • HYSTERECTOMY     • OOPHORECTOMY        General Information     Row Name 11/15/21 1230          Physical Therapy Time and Intention    Document Type evaluation  -AE     Mode of Treatment individual therapy; physical therapy  -AE     Row Name 11/15/21 1230          General Information    Patient Profile Reviewed yes  -AE     Prior Level of Function independent:  -AE     Existing Precautions/Restrictions fall  -AE     Row Name 11/15/21 1230          Living Environment    Lives With spouse  -AE     Row Name 11/15/21 1230          Home Main Entrance    Number of Stairs, Main Entrance four  -AE     Stair Railings, Main Entrance railings safe and in good condition; railing on left side (ascending)  -AE     Row Name 11/15/21 1230          Stairs Within Home, Primary    Number of Stairs, Within Home, Primary ten  -AE     Stair Railings, Within Home, Primary railings safe and in good condition; railing on right side (ascending)  -AE     Row Name 11/15/21 1230          Cognition    Orientation Status (Cognition) oriented x 4  -AE     Row Name 11/15/21 1230          Safety Issues, Functional Mobility    Safety Issues Affecting Function (Mobility) insight into deficits/self-awareness; safety precautions follow-through/compliance; positioning of assistive device  -AE     Impairments Affecting Function (Mobility) balance; sensation/sensory awareness  -AE           User Key  (r) = Recorded By, (t)  = Taken By, (c) = Cosigned By    Initials Name Provider Type    AE Clarissa Coombs PT Physical Therapist               Mobility     Row Name 11/15/21 1230          Bed Mobility    Bed Mobility bed mobility (all) activities  -AE     All Activities, [place] (Bed Mobility) contact guard assist; 1 person assist  -AE     Row Name 11/15/21 1230          Transfers    Comment (Transfers) supervision/SBA with use of FWW  -AE     Row Name 11/15/21 1230          Bed-Chair Transfer    Bed-Chair [place] (Transfers) supervision; standby assist; 1 person assist  -AE     Assistive Device (Bed-Chair Transfers) walker, front-wheeled  -AE     Row Name 11/15/21 1230          Sit-Stand Transfer    Sit-Stand [place] (Transfers) supervision; standby assist; 1 person assist  -AE     Assistive Device (Sit-Stand Transfers) walker, front-wheeled  -AE     Row Name 11/15/21 1230          Gait/Stairs (Locomotion)    [place] Level (Gait) standby assist; 1 person assist  -AE     Assistive Device (Gait) walker, front-wheeled  -AE     Distance in Feet (Gait) 120ft  -AE     Deviations/Abnormal Patterns (Gait) antalgic  -AE     Bilateral Gait Deviations forward flexed posture; heel strike decreased  -AE     Right Sided Gait Deviations leans right  -AE     [place] Level (Stairs) contact guard  -AE     Handrail Location (Stairs) left side (ascending); right side (ascending)  -AE     Number of Steps (Stairs) 4  -AE     Ascending Technique (Stairs) step-to-step  -AE     Descending Technique (Stairs) step-to-step  -AE           User Key  (r) = Recorded By, (t) = Taken By, (c) = Cosigned By    Initials Name Provider Type    AE Clarissa Coombs PT Physical Therapist               Obj/Interventions     Row Name 11/15/21 1232          Range of Motion Comprehensive    Comment, General Range of Motion BLE WFL  -AE     Row Name 11/15/21 1232          Strength Comprehensive (MMT)    Comment, General Manual Muscle Testing (MMT)  Assessment Generalized weakness  -AE           User Key  (r) = Recorded By, (t) = Taken By, (c) = Cosigned By    Initials Name Provider Type    AE Clarissa Coombs, NI Physical Therapist               Goals/Plan    No documentation.                Clinical Impression     Row Name 11/15/21 1232          Pain    Additional Documentation Pain Scale: Numbers Pre/Post-Treatment (Group)  -AE     Row Name 11/15/21 1232          Pain Scale: Numbers Pre/Post-Treatment    Pretreatment Pain Rating 0/10 - no pain  -AE     Posttreatment Pain Rating 0/10 - no pain  -AE     Pain Intervention(s) Repositioned; Ambulation/increased activity; Rest  -AE     Row Name 11/15/21 1232          Plan of Care Review    Plan of Care Reviewed With patient; spouse  -AE     Row Name 11/15/21 1232          Therapy Assessment/Plan (PT)    Patient/Family Therapy Goals Statement (PT) plans to DC home and follow up with outpatient vestibular PT  -AE     Criteria for Skilled Interventions Met (PT) no; other (see comments)  same day DC  -AE     Row Name 11/15/21 1232          Positioning and Restraints    Pre-Treatment Position in bed  -AE     Post Treatment Position bed  -AE           User Key  (r) = Recorded By, (t) = Taken By, (c) = Cosigned By    Initials Name Provider Type    AE Clarissa Coombs, NI Physical Therapist               Outcome Measures     Row Name 11/15/21 1233 11/15/21 0700       How much help from another person do you currently need...    Turning from your back to your side while in flat bed without using bedrails? 4  -AE 4  -TM    Moving from lying on back to sitting on the side of a flat bed without bedrails? 4  -AE 4  -TM    Moving to and from a bed to a chair (including a wheelchair)? 4  -AE 4  -TM    Standing up from a chair using your arms (e.g., wheelchair, bedside chair)? 4  -AE 4  -TM    Climbing 3-5 steps with a railing? 3  -AE 4  -TM    To walk in hospital room? 3  -AE 3  -TM    AM-PAC 6 Clicks Score (PT) 22  -AE 23   -TM    Row Name 11/15/21 0236          How much help from another person do you currently need...    Turning from your back to your side while in flat bed without using bedrails? 4  -FOX     Moving from lying on back to sitting on the side of a flat bed without bedrails? 4  -FOX     Moving to and from a bed to a chair (including a wheelchair)? 4  -FXO     Standing up from a chair using your arms (e.g., wheelchair, bedside chair)? 4  -FOX     Climbing 3-5 steps with a railing? 4  -FOX     To walk in hospital room? 3  -FOX     AM-PAC 6 Clicks Score (PT) 23  -FOX     Row Name 11/15/21 1233          Functional Assessment    Outcome Measure Options AM-PAC 6 Clicks Basic Mobility (PT)  -AE           User Key  (r) = Recorded By, (t) = Taken By, (c) = Cosigned By    Initials Name Provider Type    TM June Lyles RN Registered Nurse    Quentin Uriostegui RN Registered Nurse    Clarissa Grossman, NI Physical Therapist                             Physical Therapy Education                 Title: PT OT SLP Therapies (Done)     Topic: Physical Therapy (Done)     Point: Mobility training (Done)     Learning Progress Summary           Patient Acceptance, E,TB, VU,NR by AE at 11/15/2021 1234                   Point: Home exercise program (Done)     Learning Progress Summary           Patient Acceptance, E,TB, VU,NR by AE at 11/15/2021 1234                   Point: Body mechanics (Done)     Learning Progress Summary           Patient Acceptance, E,TB, VU,NR by AE at 11/15/2021 1234                   Point: Precautions (Done)     Learning Progress Summary           Patient Acceptance, E,TB, VU,NR by AE at 11/15/2021 1234                               User Key     Initials Effective Dates Name Provider Type Discipline    AE 06/16/21 -  Clarissa Coombs, NI Physical Therapist PT              PT Recommendation and Plan     Plan of Care Reviewed With: patient, spouse     Time Calculation:    PT Charges     Row Name 11/15/21 1238              Time Calculation    Start Time 1115  -AE      Stop Time 1200  -AE      Time Calculation (min) 45 min  -AE      PT Received On 11/15/21  -AE              Time Calculation- PT    Total Timed Code Minutes- PT 30 minute(s)  -AE            User Key  (r) = Recorded By, (t) = Taken By, (c) = Cosigned By    Initials Name Provider Type    AE Clarissa Coombs, PT Physical Therapist              Therapy Charges for Today     Code Description Service Date Service Provider Modifiers Qty    66366958749 HC PT EVAL LOW COMPLEXITY 2 11/15/2021 Clarissa Coombs, PT GP 1    71721957871 HC GAIT TRAINING EA 15 MIN 11/15/2021 Clarissa Coombs, PT GP 2          PT G-Codes  Outcome Measure Options: AM-PAC 6 Clicks Basic Mobility (PT)  AM-PAC 6 Clicks Score (PT): 22    Clarissa Coombs PT  11/15/2021

## 2021-11-15 NOTE — PROGRESS NOTES
"DOS: 11/15/2021  NAME: Roro Masters   : 1950  PCP: Radha Prajapati MD  Chief Complaint   Patient presents with   • Dizziness   • Nausea       Chief complaint: Vertigo  Subjective: Patient is new to me today.  This is a 71-year-old female who presented to the hospital with acute onset of vertigo.  She developed these symptoms abruptly yesterday.  She had continuous counterclockwise vertigo that was present at rest but exacerbated by changes in head position.  She denies any associated hearing loss or double vision or unilateral weakness or numbness.  CTA and MRI were done which were unremarkable.  Her symptoms have improved since admission.    Objective:  Vital signs: /67   Pulse 84   Temp 98 °F (36.7 °C) (Oral)   Resp 16   Ht 162.6 cm (64\")   Wt 68 kg (150 lb)   LMP  (LMP Unknown)   SpO2 96%   BMI 25.75 kg/m²    Gen: NAD, vitals reviewed  MS: oriented x3, recent/remote memory intact, normal attention/concentration, language intact, no neglect.  CN: visual acuity grossly normal, PERRL, EOMI with right-sided nystagmus in primary position, worse when looking to the right, better when looking to the left, cover-uncover test with some slight horizontal corrections but no vertical correction, no facial droop, no dysarthria  Motor: 5/5 throughout upper and lower extremities, normal tone  Coordination: No dysmetria bilaterally  Sensory: intact to light touch all 4 ext.    ROS:  No weakness, numbness  No fevers, chills      Laboratory results:  Lab Results   Component Value Date    GLUCOSE 123 (H) 2021    CALCIUM 9.5 2021     2021    K 3.8 2021    CO2 23.1 2021     2021    BUN 22 2021    CREATININE 0.70 2021    EGFRIFNONA 82 2021    BCR 31.4 (H) 2021    ANIONGAP 12.9 2021     Lab Results   Component Value Date    WBC 7.17 2021    HGB 10.9 (L) 2021    HCT 33.0 (L) 2021    MCV 95.9 2021     " 11/14/2021     No results found for: LDL         Review of labs: Hemoglobin 10.9, glucose 123    Review and interpretation of imaging: I personally reviewed her CTA head and neck and MRI of the brain which were essentially unremarkable.  Radiology reports reviewed    Diagnoses:  Vestibular neuritis, left ear    Comment: History, exam are consistent with left-sided vestibular neuritis.  I agree with PTs recommendation of outpatient vestibular therapy.  This is already been ordered.    Plan:  1.  Can continue scopolamine patch for a few days up to 1 week for symptomatic relief  2.  Outpatient vestibular PT for vestibular neuritis    No further neurologic work-up needed. Ok for discharge from observation unit from a neurology standpoint.    Discussed with LAINE Nugent    Total visit time 30 minutes. Greater than 50% spent counseling/coordinating care.

## 2021-11-15 NOTE — ED PROVIDER NOTES
EMERGENCY DEPARTMENT ENCOUNTER    CHIEF COMPLAINT  Chief Complaint: Dizziness  History given by: Patient  History limited by: None  Room Number: 109/1  PMD: Radha Prajapati MD      HPI:  Pt is a 71 y.o. female who presents complaining of sudden onset of dizziness that began while making breakfast this morning.  She describes the dizziness as both a lightheaded as well as a spinning sensation with a sense of being off balance.  She states that she does have some associated nausea and vomiting.  She states the symptoms did get worse upon standing today.  Nothing thus far has been shown to improve her symptoms.  She denies fever/chills, head or neck pain, chest pain, or shortness of breath.    Duration:  This am  Onset: sudden  Location: brain/neuro  Radiation: none  Quality: dizzy  Intensity/Severity: moderate  Progression: worsening  Aggravating Factors: standing  Alleviating Factors: none  Previous Episodes: none  Treatment before arrival: none    PAST MEDICAL HISTORY  Active Ambulatory Problems     Diagnosis Date Noted   • No Active Ambulatory Problems     Resolved Ambulatory Problems     Diagnosis Date Noted   • No Resolved Ambulatory Problems     Past Medical History:   Diagnosis Date   • Disease of thyroid gland    • Hyperlipidemia    • Hypertension        PAST SURGICAL HISTORY  Past Surgical History:   Procedure Laterality Date   • HYSTERECTOMY     • OOPHORECTOMY         FAMILY HISTORY  Family History   Problem Relation Age of Onset   • Breast cancer Brother    • Breast cancer Sister        SOCIAL HISTORY  Social History     Socioeconomic History   • Marital status:    Tobacco Use   • Smoking status: Former Smoker     Start date:      Quit date:      Years since quittin.8   • Smokeless tobacco: Never Used   Substance and Sexual Activity   • Alcohol use: Yes     Comment: socially       ALLERGIES  Sulfa antibiotics    REVIEW OF SYSTEMS  Review of Systems   Constitutional: Negative for fever.    HENT: Negative for sore throat.    Eyes: Negative.    Respiratory: Negative for cough and shortness of breath.    Cardiovascular: Negative for chest pain.   Gastrointestinal: Positive for nausea and vomiting. Negative for abdominal pain and diarrhea.   Genitourinary: Negative for dysuria.   Musculoskeletal: Negative for neck pain.   Skin: Negative for rash.   Allergic/Immunologic: Negative.    Neurological: Positive for dizziness and weakness. Negative for numbness and headaches.   Hematological: Negative.    Psychiatric/Behavioral: Negative.    All other systems reviewed and are negative.      PHYSICAL EXAM  ED Triage Vitals [11/14/21 2156]   Temp Heart Rate Resp BP SpO2   97.9 °F (36.6 °C) 88 16 175/88 100 %      Temp src Heart Rate Source Patient Position BP Location FiO2 (%)   Tympanic Monitor Lying Right arm --       Physical Exam  Vitals and nursing note reviewed.   Constitutional:       General: She is not in acute distress.  HENT:      Head: Normocephalic and atraumatic.   Eyes:      Pupils: Pupils are equal, round, and reactive to light.   Cardiovascular:      Rate and Rhythm: Normal rate and regular rhythm.      Heart sounds: Normal heart sounds.   Pulmonary:      Effort: Pulmonary effort is normal. No respiratory distress.      Breath sounds: Normal breath sounds.   Abdominal:      Palpations: Abdomen is soft.      Tenderness: There is no abdominal tenderness. There is no guarding or rebound.   Musculoskeletal:         General: Normal range of motion.      Cervical back: Normal range of motion and neck supple.   Skin:     General: Skin is warm and dry.      Findings: No rash.   Neurological:      Mental Status: She is alert and oriented to person, place, and time.      Sensory: Sensation is intact.   Psychiatric:         Mood and Affect: Mood and affect normal.         LAB RESULTS  Lab Results (last 24 hours)     Procedure Component Value Units Date/Time    CBC & Differential [[id number]]  (Abnormal)  Collected: 11/14/21 2233    Specimen: Blood Updated: 11/14/21 2243    Narrative:      The following orders were created for panel order CBC & Differential.  Procedure                               Abnormality         Status                     ---------                               -----------         ------                     CBC Auto Differential[[id number]]        Abnormal            Final result                 Please view results for these tests on the individual orders.    Comprehensive Metabolic Panel [[id number]]  (Abnormal) Collected: 11/14/21 2233    Specimen: Blood Updated: 11/14/21 2301     Glucose 123 mg/dL      BUN 22 mg/dL      Creatinine 0.70 mg/dL      Sodium 138 mmol/L      Potassium 3.8 mmol/L      Chloride 102 mmol/L      CO2 23.1 mmol/L      Calcium 9.5 mg/dL      Total Protein 6.8 g/dL      Albumin 4.50 g/dL      ALT (SGPT) 21 U/L      AST (SGOT) 16 U/L      Alkaline Phosphatase 50 U/L      Total Bilirubin 0.4 mg/dL      eGFR Non African Amer 82 mL/min/1.73      Globulin 2.3 gm/dL      A/G Ratio 2.0 g/dL      BUN/Creatinine Ratio 31.4     Anion Gap 12.9 mmol/L     Narrative:      GFR Normal >60  Chronic Kidney Disease <60  Kidney Failure <15      Lipase [[id number]]  (Normal) Collected: 11/14/21 2233    Specimen: Blood Updated: 11/14/21 2301     Lipase 31 U/L     CBC Auto Differential [[id number]]  (Abnormal) Collected: 11/14/21 2233    Specimen: Blood Updated: 11/14/21 2243     WBC 7.17 10*3/mm3      RBC 3.44 10*6/mm3      Hemoglobin 10.9 g/dL      Hematocrit 33.0 %      MCV 95.9 fL      MCH 31.7 pg      MCHC 33.0 g/dL      RDW 13.7 %      RDW-SD 46.1 fl      MPV 10.1 fL      Platelets 204 10*3/mm3      Neutrophil % 79.8 %      Lymphocyte % 12.4 %      Monocyte % 6.7 %      Eosinophil % 0.4 %      Basophil % 0.3 %      Immature Grans % 0.4 %      Neutrophils, Absolute 5.72 10*3/mm3      Lymphocytes, Absolute 0.89 10*3/mm3      Monocytes, Absolute 0.48 10*3/mm3      Eosinophils, Absolute 0.03  10*3/mm3      Basophils, Absolute 0.02 10*3/mm3      Immature Grans, Absolute 0.03 10*3/mm3      nRBC 0.0 /100 WBC     Protime-INR [[id number]]  (Normal) Collected: 11/14/21 2233    Specimen: Blood Updated: 11/14/21 2255     Protime 13.3 Seconds      INR 1.03    aPTT [[id number]]  (Normal) Collected: 11/14/21 2233    Specimen: Blood Updated: 11/14/21 2255     PTT 33.5 seconds     COVID PRE-OP / PRE-PROCEDURE SCREENING ORDER (NO ISOLATION) - Swab, Nasopharynx [[id number]]  (Normal) Collected: 11/14/21 2251    Specimen: Swab from Nasopharynx Updated: 11/15/21 0039    Narrative:      The following orders were created for panel order COVID PRE-OP / PRE-PROCEDURE SCREENING ORDER (NO ISOLATION) - Swab, Nasopharynx.  Procedure                               Abnormality         Status                     ---------                               -----------         ------                     COVID-19,[facility]...[[id number]]  Normal              Final result                 Please view results for these tests on the individual orders.    COVID-19,[facility] CEPHEID/FAINA NP SWAB IN TRANSPORT MEDIA 8-12 HR TAT - Swab, Nasopharynx [[id number]]  (Normal) Collected: 11/14/21 2251    Specimen: Swab from Nasopharynx Updated: 11/15/21 0039     COVID19 Not Detected    Narrative:      Fact sheet for providers: https://www.fda.gov/media/224748/download    Fact sheet for patients: https://www.fda.gov/media/721751/download    Test performed by PCR.    Urinalysis With Microscopic If Indicated (No Culture) - Urine, Clean Catch [[id number]]  (Normal) Collected: 11/15/21 0145    Specimen: Urine, Clean Catch Updated: 11/15/21 0156     Color, UA Yellow     Appearance, UA Clear     pH, UA 6.5     Specific Gravity, UA 1.021     Glucose, UA Negative     Ketones, UA Negative     Bilirubin, UA Negative     Blood, UA Negative     Protein, UA Negative     Leuk Esterase, UA Negative     Nitrite, UA Negative     Urobilinogen, UA 0.2 E.U./dL     Narrative:      Urine microscopic not indicated.          I ordered the above labs and reviewed the results    RADIOLOGY  CT Angiogram Head w AI Analysis of LVO   Final Result   1. No acute intracranial findings.   2. Mild narrowing of the proximal right internal carotid artery, in the   range of 30%.   3. No intracranial stenosis or occlusion.       FINDINGS were relayed to Dr. Lyles at 1:10 AM.       This report was finalized on 11/15/2021 2:28 AM by Dr. Nichol Hendricks M.D.          CT Angiogram Neck   Final Result   1. No acute intracranial findings.   2. Mild narrowing of the proximal right internal carotid artery, in the   range of 30%.   3. No intracranial stenosis or occlusion.       FINDINGS were relayed to Dr. Lyles at 1:10 AM.       This report was finalized on 11/15/2021 2:28 AM by Dr. Nichol Hendricks M.D.          MRI Brain With & Without Contrast    (Results Pending)        I ordered the above noted radiological studies. Interpreted by radiologist. Discussed with radiologist (Dr. Hendricks). Reviewed by me in PACS.       PROCEDURES  Procedures  EKG          EKG time: 2221  Rhythm/Rate: NSR, 72  P waves and SD: nml  QRS, axis: nml, nml   ST and T waves: nml     Interpreted Contemporaneously by me, independently viewed  No previous EKG available for comparison      PROGRESS AND CONSULTS  ED Course as of 11/15/21 0648   Sun Nov 14, 2021   2236 Stroke neurology, Dr. Lopez, was in the ED at the time of patient arrival.  After brief history and examination, I did curbside him.  He would like to see the patient and likely will recommend CTA head and neck. [RS]      ED Course User Index  [RS] David Alvarez MD     The patient was wearing a facemask upon entrance into the room and remained in such throughout their visit.  I was wearing PPE including a facemask, eye protection, as well as gloves at any point entering the room and throughout the visit    0105  On reevaluation, the patient states  that she still feels quite dizzy despite the IV medication given.  I did inform her that CTs as well as lab results are unremarkable but we would admit her to the observation unit for further neurologic testing and assessment.  The patient and spouse are in agreement with the plan and all questions have been answered.    0115  Case discussed with LAINE John ERASMO, who agrees to admit the patient to the observation unit under the supervision of Dr. Hong      MEDICAL DECISION MAKING  Results were reviewed/discussed with the patient and they were also made aware of online access. Pt also made aware that some labs, such as cultures, will not be resulted during ER visit and follow up with PMD is necessary.     MDM  Number of Diagnoses or Management Options     Amount and/or Complexity of Data Reviewed  Clinical lab tests: ordered and reviewed  Tests in the radiology section of CPT®: ordered and reviewed  Tests in the medicine section of CPT®: ordered and reviewed  Review and summarize past medical records: yes (There are no previous emergency room records available for review)  Discuss the patient with other providers: yes (Dr. Lopez, stroke neurology, who has assessed the patient at bedside and will see the patient in consultation as an inpatient.  LAINE [facility], who will admit the patient to the ops unit for Dr. Hong)  Independent visualization of images, tracings, or specimens: yes (No acute large vessel occlusions seen on CTA of the head neck)           DIAGNOSIS  Final diagnoses:   Dizziness   Vertigo   Non-intractable vomiting with nausea, unspecified vomiting type       DISPOSITION  ADMISSION    Discussed treatment plan and reason for admission with pt/family and admitting physician.  Pt/family voiced understanding of the plan for admission for further testing/treatment as needed.         Latest Documented Vital Signs:  As of 06:48 EST  BP- 137/82 HR- 74 Temp- 97.6 °F (36.4 °C) (Oral) O2  sat- 98%         Luis Alberto Lyles MD  11/15/21 0648

## 2021-11-15 NOTE — CASE MANAGEMENT/SOCIAL WORK
Discharge Planning Assessment  [facility]     Patient Name: Roro Masters  MRN: [id number]  Today's Date: 11/15/2021    Admit Date: 11/14/2021     Discharge Needs Assessment     Row Name 11/15/21 1129       Living Environment    Lives With spouse    Name(s) of Who Lives With Patient Edenilson Masters- spouse    Current Living Arrangements home/apartment/condo    Primary Care Provided by self    Family Caregiver if Needed spouse    Family Caregiver Names Edenilson    Quality of Family Relationships helpful; involved; supportive    Able to Return to Prior Arrangements yes       Resource/Environmental Concerns    Transportation Concerns car, none       Transition Planning    Patient/Family Anticipates Transition to home with family    Patient/Family Anticipated Services at Transition     Transportation Anticipated family or friend will provide       Discharge Needs Assessment    Equipment Currently Used at Home none    Concerns to be Addressed discharge planning; other (see comments)    Concerns Comments patient may need DME for safe mobility upon d/c    Anticipated Changes Related to Illness none    Equipment Needed After Discharge walker, rolling  possible               Discharge Plan     Row Name 11/15/21 1122       Plan    Plan Comments Entered room, introduced self and explained role w/PPE in place on self; patient and spouse at bedside without masks; verified information on facesheet and received permission to discuss planning in front of spouse; patient lives in a 2 level townhouse w/spouse- is independent w/ADL's; still works and drives; RX are filled at [facility] on [address]; no difficulty affording medication; patient has had OP therapy through [facility] for shoulder recently; onset of unsteadiness yesterday- spouse has needed to assist patient w/mobility; anticipate possible need for DME for ambulation depending on patient progress; Spouse will transport patient home upon d/c.               Continued Care and Services - Admitted Since 11/14/2021    Coordination has not been started for this encounter.          Demographic Summary     Row Name 11/15/21 1126       General Information    Admission Type observation    Arrived From home    Required Notices Provided Observation Status Notice    Reason for Consult discharge planning    Preferred Language English     Used During This Interaction no       Contact Information    Permission Granted to Share Info With                Functional Status     Row Name 11/15/21 1127       Functional Status    Usual Activity Tolerance good    Current Activity Tolerance fair       Functional Status, IADL    Medications independent    Meal Preparation independent    Housekeeping independent    Laundry independent    Shopping independent       Mental Status    General Appearance WDL WDL       Mental Status Summary    Recent Changes in Mental Status/Cognitive Functioning no changes       Employment/    Employment Status employed part-time  insurance    Shift Worked first shift               Psychosocial    No documentation.                Abuse/Neglect    No documentation.                Legal    No documentation.                Substance Abuse    No documentation.                Patient Forms    No documentation.                   Clarissa Cuevas RN

## 2021-11-15 NOTE — ED NOTES
Patient c/o nausea, dizziness, and weakness since this morning. States it got worse about 2 hours ago.     Pt noted to have mask on when this RN entered the room.  This RN wore appropriate PPE throughout our encounter. Hand hygiene performed upon entering and exiting room.       Toyin Dominguez, ALYSHA  11/14/21 5399

## 2021-11-15 NOTE — NURSING NOTE
PT here for evaluation. Pt eating her breakfast at this time. Pt to have eply maneuver and Clarissa with PT aware pt eating breakfast at this time. Eply to be rescheduled for a later time.

## 2021-11-15 NOTE — PLAN OF CARE
Goal Outcome Evaluation:  Plan of Care Reviewed With: patient   Pt was admitted to ED OBS for vertigo/dizziness. Pt had an acute onset of symptoms yesterday morning. Pt had some nausea in the ED but had resolved by time she arrived to OBS. Pt is still dizzy with positional changes from laying to sitting to standing. Pt did ambulate to bathroom with assistance. Pt is to complete MRI and epley maneuver in the AM. Follow up with neuro. Vitals have remained stable, RN to continue to monitor.      Progress: no change

## 2021-11-15 NOTE — PLAN OF CARE
Goal Outcome Evaluation:  Plan of Care Reviewed With: patient, spouse      Pt being discharged with appropriate follow up. Patient and spouse agreeable with plan of care.  Progress: improving

## 2021-11-15 NOTE — PLAN OF CARE
Pt is a 70 yo F admitted for management of possible vertigo. At admission, pt reported feeling of the room spinning from the right to the left along with left upgaze nystagmus which is sustained on performing the [place]-Hallpike maneuver. However today patient reports being severely dizzy at rest, head still not moving. Unprovoked by head turns left or right. However she states at home she gets out on R side of bed and during short hospital stay RN staff has been getting her up on the R side of bed. When performing cardinal fields of vision testing, patient did have R lateral and upbeating nystagmus. However negative impulse testing, no corrective VOR, and negative [place] Hallpike bilaterally. Epleys not warranted at this time.    Patient states she lives at home with 4 steps L HR to access, flight of stairs and R HR to access upstairs BR/BA. Reports PLOF as independent with no use of AD, does own SPC. Today patient required supervision/SBA for all mobility with use of FWW. Cues for safety including to slow down and minimize excessive UE use. Patient able to ambulate ~120ft with supervision, minimal difficulty with turning. Performed alternating marches at bedside with single UE use on walker to mimic stair training at home. Able to perform with CGA.    Safe is patient to DC home with  to provide 24hour supervision and use of FWW until can be seen by [facility] outpatient vestibular PT for more comprehensive vestibular exam.        Problem: Adult Inpatient Plan of Care  Goal: Plan of Care Review  Outcome: Ongoing, Progressing  Flowsheets (Taken 11/15/2021 4098 by Quentin Brown RN)  Plan of Care Reviewed With: patient   Goal Outcome Evaluation:

## 2021-11-15 NOTE — ED NOTES
Pt arrived by EMS from home. C/o of dizzy with nausea with standing and sitting.     Patient was placed in face mask during first look triage.  Patient was wearing a face mask throughout encounter.  I wore personal protective equipment throughout the encounter.  Hand hygiene was performed before and after patient encounter.        Daly Kaur, RN  11/14/21 3540

## 2021-11-19 ENCOUNTER — HOSPITAL ENCOUNTER (OUTPATIENT)
Dept: PHYSICAL THERAPY | Facility: HOSPITAL | Age: 71
Setting detail: THERAPIES SERIES
Discharge: HOME | End: 2021-11-19

## 2021-11-19 DIAGNOSIS — R42 DIZZINESS: ICD-10-CM

## 2021-11-19 DIAGNOSIS — H81.20 VESTIBULAR NEURONITIS, UNSPECIFIED LATERALITY: Primary | ICD-10-CM

## 2021-11-19 PROCEDURE — 97161 PT EVAL LOW COMPLEX 20 MIN: CPT | Performed by: PHYSICAL THERAPIST

## 2021-11-19 NOTE — THERAPY EVALUATION
Outpatient Physical Therapy Vestibular Initial Evaluation  [facility]     Patient Name: Roro Masters  : 1950  MRN: [id number]  Today's Date: 2021      Visit Date: 2021    Patient Active Problem List   Diagnosis   • Dizziness        Past Medical History:   Diagnosis Date   • Disease of thyroid gland    • Hyperlipidemia    • Hypertension         Past Surgical History:   Procedure Laterality Date   • HYSTERECTOMY     • OOPHORECTOMY           Visit Dx:     ICD-10-CM ICD-9-CM   1. Vestibular neuronitis, unspecified laterality  H81.20 386.12   2. Dizziness  R42 780.4        Patient History     Row Name 21 1000 21 0819          History    Chief Complaint -- Balance Problems; Difficulty Walking; Dizziness  -GR (r) patient (t)     Date Current Problem(s) Began -- 21  -GR (r) patient (t)     Brief Description of Current Complaint Onset vertigo this past , states she got up and was really dizzy. She rested most of the day in a recliner and every time she would get up she felt like the room was spinning. She was nauseous. She called 911 and was brought to [facility] ED and then the observation unit. She was seen by PT, no manuevers were performed.  She was d/c'd home and has generally been doing better although still off balance.  She has a patch for nausea, no meclizine. She denies pains, does have a buzzing in her ears. Thinks she may have had COVID in 2020 but was not diagnosed. She is ambulating with a cane today because of her dizziness; does not use at baseline. Does have HTN, controlled with medication.  -GR Dizziness & walking  -GR (r) patient (t)     Patient/Caregiver Goals -- Return to prior level of function; Return to work; Improve mobility; Relief from dizziness; Know what to do to help the symptoms  -GR (r) patient (t)     Hand Dominance -- right-handed  -GR (r) patient (t)     Occupation/sports/leisure activities -- Line dancing  -GR (r) patient (t)      Patient seeing anyone else for problem(s)? -- Primary doctor.  -GR (r) patient (t)     What clinical tests have you had for this problem? -- CT scan; MRI  -GR (r) patient (t)     Are you or can you be pregnant -- No  -GR (r) patient (t)            Pain     Pain Location --  denies pain  -GR --            Fall Risk Assessment    Any falls in the past year: -- No  -GR (r) patient (t)            Services    Prior Rehab/Home Health Experiences -- No  -GR (r) patient (t)     Are you currently receiving Home Health services -- No  -GR (r) patient (t)     Do you plan to receive Home Health services in the near future -- No  -GR (r) patient (t)            Daily Activities    Primary Language -- English  -GR (r) patient (t)     Are you able to read -- Yes  -GR (r) patient (t)     Are you able to write -- Yes  -GR (r) patient (t)     How does patient learn best? -- Listening; Reading; Pictures/Video  -GR (r) patient (t)     Pt Participated in POC and Goals Yes  -GR --            Safety    Are you being hurt, hit, or frightened by anyone at home or in your life? -- No  -GR (r) patient (t)     Are you being neglected by a caregiver -- No  -GR (r) patient (t)     Have you had any of the following issues with -- N/A  -GR (r) patient (t)           User Key  (r) = Recorded By, (t) = Taken By, (c) = Cosigned By    Initials Name Provider Type    GR Lemuel Epps, PT Physical Therapist    patient Roro Masters --                 Vestibular Eval     Row Name 11/19/21 1000             Occulomotor Exam Fixation Present    Occular ROM Normal  -GR      Spontaneous Nystagmus Absent  -GR      Gaze-induced Nystagmus Left:; Increased beating to right  -GR      Smooth Pursuit Normal  -GR      Saccades Intact  -GR      Convergence Normal  -GR              Vestibulo-Occular Reflex (VOR)    VOR to Fast Head Movement/Head Thrust Test Normal  -GR      VOR Cancellation Normal  -GR              Positional Testing    Positional Testing Without  infrared goggles  -GR      Vertebrobasilar Artery Screen - Right Negative  -GR      Vertebrobasilar Artery Screen - Left Negative  -GR      [place]-Hallpike Right No nystagmus  -GR      [place]-Hallpike Left No nystagmus  -GR      Horizontal Roll Test Right No nystagmus  -GR      Horizontal Roll Test Left No nystagmus  -GR              High-level Balance    High-level Balance Other modified CTSIB 32/120  -GR            User Key  (r) = Recorded By, (t) = Taken By, (c) = Cosigned By    Initials Name Provider Type    Lemuel Alvarez, PT Physical Therapist                            Therapy Education  Education Details: [id number]       OP Exercises     Row Name 11/19/21 1300             Total Minutes    04482 -  PT Neuromuscular Reeducation Minutes 2  -GR              Exercise 1    Exercise Name 1 [id number]  -GR      Cueing 1 Demo  -GR      Sets 1 1  -GR      Reps 1 1  -GR      Time 1 30 seconds  -GR            User Key  (r) = Recorded By, (t) = Taken By, (c) = Cosigned By    Initials Name Provider Type    Lemuel Alvarez, PT Physical Therapist                             PT OP Goals     Row Name 11/19/21 1300          PT Short Term Goals    STG Date to Achieve 12/19/21  -GR     STG 1 Patient will report no worse with VOR.  -GR     STG 1 Progress New  -GR     STG 2 Patient will be independent with initial HEP.  -GR     STG 2 Progress New  -GR            Long Term Goals    LTG Date to Achieve 01/18/22  -GR     LTG 1 Patient will score </=10 on the DHI to indicate improve perceived positional tolerance.  -GR     LTG 1 Progress New  -GR     LTG 2 Patient will remain negative for BPPV all canals.  -GR     LTG 2 Progress New  -GR     LTG 3 Patient will resume normal gait without AD.  -GR     LTG 3 Progress New  -GR            Time Calculation    PT Goal Re-Cert Due Date 02/17/22  -GR           User Key  (r) = Recorded By, (t) = Taken By, (c) = Cosigned By    Initials Name Provider Type    Lemuel Alvarez, PT Physical  Therapist                 PT Assessment/Plan     Row Name 21 1300          PT Assessment    Functional Limitations Limitations in functional capacity and performance; Performance in leisure activities; Limitations in community activities  -GR     Impairments Balance  -GR     Assessment Comments 72 y/o F referred to vestibular PT evaluation for 1 wk insidious onset vertigo. She tests negative for BPPV of all canals, s/s consistent with likely neuritis.  PMH pertinent for HTN. Advised on HEP and educated on expectations for a typical neuritis. Patient to follow up in 6 weeks unless symptoms regress. Thank you for the referral of this evolving condition.  -GR     Please refer to paper survey for additional self-reported information No  -GR     Rehab Potential Good  -GR     Patient/caregiver participated in establishment of treatment plan and goals Yes  -GR     Patient would benefit from skilled therapy intervention Yes  -GR            PT Plan    PT Frequency 1x/week  -GR     Predicted Duration of Therapy Intervention (PT) 4 visits  -GR     Planned CPT's? PT EVAL LOW COMPLEXITY: 30112; PT THER PROC EA 15 MIN: 47411; PT THER ACT EA 15 MIN: 64247; PT NEUROMUSC RE-EDUCATION EA 15 MIN: 02267; PT CANALITH REPOSITIONIN  -GR     Physical Therapy Interventions (Optional Details) balance training; home exercise program; postural re-education; patient/family education; vestibular training  -GR     PT Plan Comments Assess response to VOR and recheck for BPPV if needed.  -GR           User Key  (r) = Recorded By, (t) = Taken By, (c) = Cosigned By    Initials Name Provider Type    GR Lemuel Epps, PT Physical Therapist                           Time Calculation:   Start Time: 1004  Stop Time: 1034  Time Calculation (min): 30 min  Total Timed Code Minutes- PT: 2 minute(s)  Timed Charges  26171 -  PT Neuromuscular Reeducation Minutes: 2  Untimed Charges  PT Eval/Re-eval Minutes: 28  Total Minutes  Timed Charges  Total Minutes: 2  Untimed Charges Total Minutes: 28   Total Minutes: 28   Therapy Charges for Today     Code Description Service Date Service Provider Modifiers Qty    [id number] HC PT EVAL LOW COMPLEXITY 2 11/19/2021 Lemuel Epps, PT GP 1                    Lemuel Epps, PT  11/19/2021

## 2022-01-19 ENCOUNTER — TRANSCRIBE ORDERS (OUTPATIENT)
Dept: ADMINISTRATIVE | Facility: HOSPITAL | Age: 72
End: 2022-01-19

## 2022-01-19 DIAGNOSIS — Z12.31 VISIT FOR SCREENING MAMMOGRAM: Primary | ICD-10-CM

## 2022-04-08 ENCOUNTER — HOSPITAL ENCOUNTER (OUTPATIENT)
Dept: MAMMOGRAPHY | Facility: HOSPITAL | Age: 72
Discharge: HOME | End: 2022-04-08
Admitting: INTERNAL MEDICINE

## 2022-04-08 DIAGNOSIS — Z12.31 VISIT FOR SCREENING MAMMOGRAM: ICD-10-CM

## 2022-04-08 PROCEDURE — 77063 BREAST TOMOSYNTHESIS BI: CPT

## 2022-04-08 PROCEDURE — 77067 SCR MAMMO BI INCL CAD: CPT

## 2023-02-20 ENCOUNTER — TRANSCRIBE ORDERS (OUTPATIENT)
Dept: ADMINISTRATIVE | Facility: HOSPITAL | Age: 73
End: 2023-02-20
Payer: MEDICARE

## 2023-02-20 DIAGNOSIS — Z12.31 VISIT FOR SCREENING MAMMOGRAM: Primary | ICD-10-CM

## 2023-04-12 ENCOUNTER — HOSPITAL ENCOUNTER (OUTPATIENT)
Dept: MAMMOGRAPHY | Facility: HOSPITAL | Age: 73
Discharge: HOME | End: 2023-04-12
Admitting: INTERNAL MEDICINE
Payer: MEDICARE

## 2023-04-12 DIAGNOSIS — Z12.31 VISIT FOR SCREENING MAMMOGRAM: ICD-10-CM

## 2023-04-12 PROCEDURE — 77067 SCR MAMMO BI INCL CAD: CPT

## 2023-04-12 PROCEDURE — 77063 BREAST TOMOSYNTHESIS BI: CPT

## 2024-02-19 VITALS
DIASTOLIC BLOOD PRESSURE: 37 MMHG | SYSTOLIC BLOOD PRESSURE: 164 MMHG | DIASTOLIC BLOOD PRESSURE: 45 MMHG | OXYGEN SATURATION: 100 % | SYSTOLIC BLOOD PRESSURE: 102 MMHG | HEART RATE: 65 BPM | RESPIRATION RATE: 12 BRPM | TEMPERATURE: 97.6 F | OXYGEN SATURATION: 93 % | HEART RATE: 76 BPM | RESPIRATION RATE: 13 BRPM | SYSTOLIC BLOOD PRESSURE: 95 MMHG | RESPIRATION RATE: 17 BRPM | SYSTOLIC BLOOD PRESSURE: 176 MMHG | HEART RATE: 66 BPM | RESPIRATION RATE: 18 BRPM | DIASTOLIC BLOOD PRESSURE: 78 MMHG | DIASTOLIC BLOOD PRESSURE: 73 MMHG | DIASTOLIC BLOOD PRESSURE: 48 MMHG | WEIGHT: 149 LBS | SYSTOLIC BLOOD PRESSURE: 83 MMHG | RESPIRATION RATE: 16 BRPM | HEART RATE: 77 BPM | DIASTOLIC BLOOD PRESSURE: 38 MMHG | DIASTOLIC BLOOD PRESSURE: 54 MMHG | HEART RATE: 68 BPM | RESPIRATION RATE: 14 BRPM | HEART RATE: 62 BPM | DIASTOLIC BLOOD PRESSURE: 42 MMHG | HEART RATE: 78 BPM | HEART RATE: 71 BPM | SYSTOLIC BLOOD PRESSURE: 105 MMHG | OXYGEN SATURATION: 94 % | HEIGHT: 64 IN | HEART RATE: 80 BPM | SYSTOLIC BLOOD PRESSURE: 87 MMHG | TEMPERATURE: 97.2 F | OXYGEN SATURATION: 99 % | SYSTOLIC BLOOD PRESSURE: 82 MMHG

## 2024-02-22 ENCOUNTER — AMBULATORY SURGICAL CENTER (AMBULATORY)
Dept: URBAN - METROPOLITAN AREA SURGERY 17 | Facility: SURGERY | Age: 74
End: 2024-02-22

## 2024-02-22 ENCOUNTER — OFFICE (AMBULATORY)
Dept: URBAN - METROPOLITAN AREA PATHOLOGY 4 | Facility: PATHOLOGY | Age: 74
End: 2024-02-22
Payer: MEDICARE

## 2024-02-22 DIAGNOSIS — Z12.11 ENCOUNTER FOR SCREENING FOR MALIGNANT NEOPLASM OF COLON: ICD-10-CM

## 2024-02-22 DIAGNOSIS — D12.0 BENIGN NEOPLASM OF CECUM: ICD-10-CM

## 2024-02-22 DIAGNOSIS — K57.30 DIVERTICULOSIS OF LARGE INTESTINE WITHOUT PERFORATION OR ABS: ICD-10-CM

## 2024-02-22 PROBLEM — K63.5 POLYP OF COLON: Status: ACTIVE | Noted: 2024-02-22

## 2024-02-22 LAB
GI HISTOLOGY: A. CECUM: (no result)
GI HISTOLOGY: PDF REPORT: (no result)

## 2024-02-22 PROCEDURE — 88305 TISSUE EXAM BY PATHOLOGIST: CPT | Performed by: PATHOLOGY

## 2024-02-22 PROCEDURE — 45385 COLONOSCOPY W/LESION REMOVAL: CPT | Mod: PT | Performed by: INTERNAL MEDICINE

## 2024-02-26 ENCOUNTER — TRANSCRIBE ORDERS (OUTPATIENT)
Dept: ADMINISTRATIVE | Facility: HOSPITAL | Age: 74
End: 2024-02-26
Payer: MEDICARE

## 2024-02-26 DIAGNOSIS — Z12.31 VISIT FOR SCREENING MAMMOGRAM: Primary | ICD-10-CM

## 2024-04-18 ENCOUNTER — HOSPITAL ENCOUNTER (OUTPATIENT)
Dept: MAMMOGRAPHY | Facility: HOSPITAL | Age: 74
Discharge: HOME | End: 2024-04-18
Admitting: INTERNAL MEDICINE
Payer: MEDICARE

## 2024-04-18 DIAGNOSIS — Z12.31 VISIT FOR SCREENING MAMMOGRAM: ICD-10-CM

## 2024-04-18 PROCEDURE — 77063 BREAST TOMOSYNTHESIS BI: CPT

## 2024-04-18 PROCEDURE — 77067 SCR MAMMO BI INCL CAD: CPT

## 2024-04-24 ENCOUNTER — TRANSCRIBE ORDERS (OUTPATIENT)
Dept: ADMINISTRATIVE | Facility: HOSPITAL | Age: 74
End: 2024-04-24
Payer: MEDICARE

## 2024-04-24 DIAGNOSIS — R92.30 BREAST DENSITY: Primary | ICD-10-CM

## 2024-04-25 ENCOUNTER — TRANSCRIBE ORDERS (OUTPATIENT)
Dept: ADMINISTRATIVE | Facility: HOSPITAL | Age: 74
End: 2024-04-25
Payer: MEDICARE

## 2024-04-25 DIAGNOSIS — R92.30 DENSE BREAST: Primary | ICD-10-CM

## 2024-05-02 ENCOUNTER — HOSPITAL ENCOUNTER (OUTPATIENT)
Dept: ULTRASOUND IMAGING | Facility: HOSPITAL | Age: 74
Discharge: HOME | End: 2024-05-02
Admitting: INTERNAL MEDICINE
Payer: MEDICARE

## 2024-05-02 DIAGNOSIS — R92.30 BREAST DENSITY: ICD-10-CM

## 2024-05-02 PROCEDURE — 76641 ULTRASOUND BREAST COMPLETE: CPT

## 2025-02-28 ENCOUNTER — TRANSCRIBE ORDERS (OUTPATIENT)
Dept: ADMINISTRATIVE | Facility: HOSPITAL | Age: 75
End: 2025-02-28
Payer: MEDICARE

## 2025-02-28 DIAGNOSIS — Z12.31 VISIT FOR SCREENING MAMMOGRAM: Primary | ICD-10-CM

## 2025-04-23 ENCOUNTER — HOSPITAL ENCOUNTER (OUTPATIENT)
Dept: MAMMOGRAPHY | Facility: HOSPITAL | Age: 75
Discharge: HOME | End: 2025-04-23
Admitting: INTERNAL MEDICINE
Payer: MEDICARE

## 2025-04-23 DIAGNOSIS — Z12.31 VISIT FOR SCREENING MAMMOGRAM: ICD-10-CM

## 2025-04-23 PROCEDURE — 77067 SCR MAMMO BI INCL CAD: CPT

## 2025-04-23 PROCEDURE — 77063 BREAST TOMOSYNTHESIS BI: CPT
